# Patient Record
Sex: MALE | Race: WHITE | NOT HISPANIC OR LATINO | Employment: FULL TIME | URBAN - NONMETROPOLITAN AREA
[De-identification: names, ages, dates, MRNs, and addresses within clinical notes are randomized per-mention and may not be internally consistent; named-entity substitution may affect disease eponyms.]

---

## 2017-05-16 ENCOUNTER — OFFICE VISIT (OUTPATIENT)
Dept: CARDIOLOGY | Facility: CLINIC | Age: 82
End: 2017-05-16

## 2017-05-16 VITALS
DIASTOLIC BLOOD PRESSURE: 70 MMHG | SYSTOLIC BLOOD PRESSURE: 120 MMHG | WEIGHT: 224 LBS | HEART RATE: 68 BPM | HEIGHT: 71 IN | BODY MASS INDEX: 31.36 KG/M2

## 2017-05-16 DIAGNOSIS — E11.9 TYPE 2 DIABETES MELLITUS WITHOUT COMPLICATION, WITHOUT LONG-TERM CURRENT USE OF INSULIN (HCC): ICD-10-CM

## 2017-05-16 DIAGNOSIS — I25.10 CORONARY ARTERY DISEASE INVOLVING NATIVE CORONARY ARTERY OF NATIVE HEART WITHOUT ANGINA PECTORIS: Primary | ICD-10-CM

## 2017-05-16 DIAGNOSIS — I10 ESSENTIAL HYPERTENSION: ICD-10-CM

## 2017-05-16 DIAGNOSIS — E78.49 OTHER HYPERLIPIDEMIA: ICD-10-CM

## 2017-05-16 PROBLEM — E78.5 HYPERLIPEMIA: Status: ACTIVE | Noted: 2017-05-16

## 2017-05-16 PROBLEM — E03.9 HYPOTHYROIDISM: Status: ACTIVE | Noted: 2017-05-16

## 2017-05-16 PROCEDURE — 99213 OFFICE O/P EST LOW 20 MIN: CPT | Performed by: NURSE PRACTITIONER

## 2017-11-20 ENCOUNTER — OFFICE VISIT (OUTPATIENT)
Dept: CARDIOLOGY | Facility: CLINIC | Age: 82
End: 2017-11-20

## 2017-11-20 VITALS
BODY MASS INDEX: 32.2 KG/M2 | HEART RATE: 64 BPM | DIASTOLIC BLOOD PRESSURE: 88 MMHG | SYSTOLIC BLOOD PRESSURE: 140 MMHG | WEIGHT: 230 LBS | HEIGHT: 71 IN

## 2017-11-20 DIAGNOSIS — I25.118 CORONARY ARTERY DISEASE INVOLVING NATIVE CORONARY ARTERY OF NATIVE HEART WITH OTHER FORM OF ANGINA PECTORIS (HCC): Primary | ICD-10-CM

## 2017-11-20 DIAGNOSIS — E88.81 METABOLIC SYNDROME: ICD-10-CM

## 2017-11-20 DIAGNOSIS — E11.9 TYPE 2 DIABETES MELLITUS WITHOUT COMPLICATION, WITHOUT LONG-TERM CURRENT USE OF INSULIN (HCC): ICD-10-CM

## 2017-11-20 DIAGNOSIS — E78.49 OTHER HYPERLIPIDEMIA: ICD-10-CM

## 2017-11-20 DIAGNOSIS — I10 ESSENTIAL HYPERTENSION: ICD-10-CM

## 2017-11-20 DIAGNOSIS — R06.02 SHORTNESS OF BREATH: ICD-10-CM

## 2017-11-20 PROCEDURE — 99214 OFFICE O/P EST MOD 30 MIN: CPT | Performed by: NURSE PRACTITIONER

## 2017-11-20 NOTE — PROGRESS NOTES
Chief Complaint   Patient presents with   • Follow-up     For cardiac management. Only shortness of breath is when he bends over, he relates to weight. [facility]. He reports having more pain in left leg, he states nerve pain.       Subjective       Endy Bauer is a 82 y.o. male  with a history of ischemic heart disease diagnosed in 2008 when he underwent stenting followed by bypass surgery in 2009 and restenting in 2012. In April 2015 he was seen to establish cardiac care given his cardiologist had moved. Cardiac workup was done in the form of stress test and echocardiogram. No ischemia was noted and his ejection fraction remained normal.  Today he comes to the office for a follow up visit. He admits to shortness of breath but mostly when bending over. His blood pressure is increased today which concerns him since it has been normal most often when checked at home.     HPI         Cardiac History:    Past Surgical History:   Procedure Laterality Date   • CARDIAC CATHETERIZATION  04/24/2012     Dr DOWNS. Patent LIMA. 100% all SVG, 90% RCA- 3.5x16 & 4.0x16 Promus    • CATH LAB PROCEDURE  2008    STENT   • CORONARY ARTERY BYPASS GRAFT  2009     ([facility]utus) LIMA to LAD, SVG to OM, SVG to D1    • ECHO - CONVERTED  05/05/2009    (Dr. Barrett) EF 60%    • ECHO - CONVERTED  05/05/2015    EF 65%, RVSP 17 mmHg    • OTHER SURGICAL HISTORY  04/13/2012    L. Cardiolite- () Anterior Ischemia    • OTHER SURGICAL HISTORY  05/05/2015    Sarah- negative for ischemia        Current Outpatient Prescriptions   Medication Sig Dispense Refill   • aspirin 81 MG EC tablet Take 81 mg by mouth Daily.     • atorvastatin (LIPITOR) 10 MG tablet Take 10 mg by mouth Daily.     • clopidogrel (PLAVIX) 75 MG tablet Take 75 mg by mouth Daily.     • Coenzyme Q10 (CO Q-10) 100 MG capsule Take  by mouth Daily.     • diltiazem CD (CARDIZEM CD) 120 MG 24 hr capsule Take 120 mg by mouth Daily.     •  lisinopril (PRINIVIL,ZESTRIL) 20 MG tablet Take 20 mg by mouth Daily.     • metFORMIN (GLUCOPHAGE) 1000 MG tablet Take 1,000 mg by mouth 2 (Two) Times a Day With Meals.     • metoprolol succinate XL (TOPROL-XL) 50 MG 24 hr tablet Take 50 mg by mouth Daily.     • Misc Natural Products (OSTEO BI-FLEX JOINT SHIELD PO) Take  by mouth Daily.     • Multiple Vitamins-Minerals (MULTI COMPLETE PO) Take  by mouth Daily.     • Omega-3 Fatty Acids (FISH OIL) 1000 MG capsule capsule Take  by mouth Daily With Breakfast.     • Saw Palmetto, Serenoa repens, (SAW PALMETTO PO) Take  by mouth Daily.       No current facility-administered medications for this visit.        Demerol [meperidine]    Past Medical History:   Diagnosis Date   • Arthritis    • CAD (coronary artery disease)    • Diabetes    • History of hernia surgery    • Hypercholesterolemia    • Hypertension    • Hypothyroidism        Social History     Social History   • Marital status:      Spouse name: N/A   • Number of children: N/A   • Years of education: N/A     Occupational History   • Not on file.     Social History Main Topics   • Smoking status: Former Smoker     Packs/day: 1.00     Years: 30.00     Quit date: 1995   • Smokeless tobacco: Current User     Types: Chew   • Alcohol use No   • Drug use: No   • Sexual activity: Not on file     Other Topics Concern   • Not on file     Social History Narrative       Family History   Problem Relation Age of Onset   • Lung cancer Mother    • Heart attack Father        Review of Systems   Constitution: Negative for decreased appetite, weakness and malaise/fatigue.   HENT: Negative for congestion, hoarse voice, nosebleeds and sore throat.    Eyes: Negative for blurred vision.   Cardiovascular: Negative for chest pain, irregular heartbeat, leg swelling, near-syncope, palpitations and paroxysmal nocturnal dyspnea.   Respiratory: Positive for shortness of breath. Negative for sleep disturbances due to breathing.   "  Endocrine: Negative for cold intolerance and heat intolerance.   Hematologic/Lymphatic: Negative for adenopathy. Does not bruise/bleed easily.   Skin: Negative for itching and nail changes.   Musculoskeletal: Positive for back pain and myalgias (left upper, posterior leg area). Negative for arthritis.   Gastrointestinal: Negative for abdominal pain, dysphagia, heartburn, melena and nausea.   Genitourinary: Negative for dysuria and hematuria.   Neurological: Negative for dizziness, light-headedness, seizures and vertigo.   Psychiatric/Behavioral: Negative for altered mental status and memory loss. The patient is not nervous/anxious.    Allergic/Immunologic: Negative for hives.    Diabetes- Yes  Thyroid-abnormal    Objective     /88 (BP Location: Right arm)  Pulse 64  Ht 71\" (180.3 cm)  Wt 230 lb (104 kg)  BMI 32.08 kg/m2    Physical Exam   Constitutional: He is oriented to person, place, and time. He appears well-nourished.   HENT:   Head: Normocephalic.   Eyes: Conjunctivae are normal. Pupils are equal, round, and reactive to light.   Neck: Normal range of motion. Neck supple. Carotid bruit is not present.   Cardiovascular: Normal rate, regular rhythm, S1 normal, S2 normal and normal pulses.    No murmur heard.  Slightly loud S2   Pulmonary/Chest: Breath sounds normal. He has no rales.   Abdominal: Soft. Bowel sounds are normal. He exhibits no distension. There is no tenderness.   Musculoskeletal: Normal range of motion. He exhibits no edema.   Neurological: He is alert and oriented to person, place, and time.   Skin: Skin is warm and dry. No rash noted. No pallor.   Psychiatric: He has a normal mood and affect. His behavior is normal. Judgment and thought content normal.   Vitals reviewed.   Procedures        Assessment/Plan      Endy was seen today for follow-up.    Diagnoses and all orders for this visit:    Coronary artery disease involving native coronary artery of native heart with other form of " angina pectoris  -     Stress Test With Myocardial Perfusion One Day; Future  -     Adult Transthoracic Echo Complete W/ Cont if Necessary Per Protocol; Future    Essential hypertension  -     Stress Test With Myocardial Perfusion One Day; Future  -     Adult Transthoracic Echo Complete W/ Cont if Necessary Per Protocol; Future    Other hyperlipidemia  -     Stress Test With Myocardial Perfusion One Day; Future  -     Adult Transthoracic Echo Complete W/ Cont if Necessary Per Protocol; Future    Type 2 diabetes mellitus without complication, without long-term current use of insulin  -     Stress Test With Myocardial Perfusion One Day; Future  -     Adult Transthoracic Echo Complete W/ Cont if Necessary Per Protocol; Future    Metabolic syndrome    Shortness of breath        [facility] which he plans on having done next month and will bring a copy to the office. He reports some issues with left leg pain being managed by chiropractor and finding beneficial. Blood pressure today is elevated. He agrees to come to the office a couple of weeks to have reassess. We will also plan to repeat cardiac workup given his known CAD and risk factors, especially for silent ischemia related to diabetes. No medication changes made at this time. DASH diet encouraged. Further recommendations based on test results.            Electronically signed by LUISA Gregory,  November 20, 2017 4:38 PM

## 2017-12-11 ENCOUNTER — TELEPHONE (OUTPATIENT)
Dept: CARDIOLOGY | Facility: CLINIC | Age: 82
End: 2017-12-11

## 2017-12-11 ENCOUNTER — CLINICAL SUPPORT (OUTPATIENT)
Dept: CARDIOLOGY | Facility: CLINIC | Age: 82
End: 2017-12-11

## 2017-12-11 VITALS — SYSTOLIC BLOOD PRESSURE: 164 MMHG | DIASTOLIC BLOOD PRESSURE: 90 MMHG | HEART RATE: 63 BPM

## 2017-12-11 DIAGNOSIS — Z01.30 BLOOD PRESSURE CHECK: Primary | ICD-10-CM

## 2017-12-11 NOTE — TELEPHONE ENCOUNTER
Patient came in for BP check per your request and brought home monitor.    Manual:    /100 (left)  /90 (right)  HR 63    Home monitor:    /88 Right arm only. Home monitor would not work on left arm.    Patient is going to purchase new monitor.    Current medications:    Diltiazem  mg QD  Lisinopril 20 mg QD  Metoprolol Succ. XL 50 mg QD  Plavix 75 mg

## 2017-12-11 NOTE — TELEPHONE ENCOUNTER
[facility] He may benefit in adding diuretic but without lab results I will advise to increase Cardizem CD to 240 mg daily. He will be having a stress and echo in near future. Thanks.

## 2017-12-19 ENCOUNTER — APPOINTMENT (OUTPATIENT)
Dept: CARDIOLOGY | Facility: HOSPITAL | Age: 82
End: 2017-12-19

## 2018-01-02 ENCOUNTER — HOSPITAL ENCOUNTER (OUTPATIENT)
Dept: CARDIOLOGY | Facility: HOSPITAL | Age: 83
Discharge: HOME | End: 2018-01-02

## 2018-01-02 ENCOUNTER — OUTSIDE FACILITY SERVICE (OUTPATIENT)
Dept: CARDIOLOGY | Facility: CLINIC | Age: 83
End: 2018-01-02

## 2018-01-02 DIAGNOSIS — E11.9 TYPE 2 DIABETES MELLITUS WITHOUT COMPLICATION, WITHOUT LONG-TERM CURRENT USE OF INSULIN (HCC): ICD-10-CM

## 2018-01-02 DIAGNOSIS — I10 ESSENTIAL HYPERTENSION: ICD-10-CM

## 2018-01-02 DIAGNOSIS — E78.49 OTHER HYPERLIPIDEMIA: ICD-10-CM

## 2018-01-02 DIAGNOSIS — I25.118 CORONARY ARTERY DISEASE INVOLVING NATIVE CORONARY ARTERY OF NATIVE HEART WITH OTHER FORM OF ANGINA PECTORIS (HCC): ICD-10-CM

## 2018-01-02 LAB
MAXIMAL PREDICTED HEART RATE: 140 BPM
MAXIMAL PREDICTED HEART RATE: 140 BPM
STRESS TARGET HR: 119 BPM
STRESS TARGET HR: 119 BPM

## 2018-01-02 PROCEDURE — 0 TECHNETIUM SESTAMIBI: Performed by: INTERNAL MEDICINE

## 2018-01-02 PROCEDURE — 93018 CV STRESS TEST I&R ONLY: CPT | Performed by: INTERNAL MEDICINE

## 2018-01-02 PROCEDURE — 93306 TTE W/DOPPLER COMPLETE: CPT | Performed by: INTERNAL MEDICINE

## 2018-01-02 PROCEDURE — A9500 TC99M SESTAMIBI: HCPCS | Performed by: INTERNAL MEDICINE

## 2018-01-02 PROCEDURE — 25010000002 REGADENOSON 0.4 MG/5ML SOLUTION: Performed by: INTERNAL MEDICINE

## 2018-01-02 PROCEDURE — 78452 HT MUSCLE IMAGE SPECT MULT: CPT

## 2018-01-02 PROCEDURE — 78452 HT MUSCLE IMAGE SPECT MULT: CPT | Performed by: INTERNAL MEDICINE

## 2018-01-02 PROCEDURE — 93017 CV STRESS TEST TRACING ONLY: CPT

## 2018-01-02 PROCEDURE — 93306 TTE W/DOPPLER COMPLETE: CPT

## 2018-01-02 RX ADMIN — TECHNETIUM TC-99M SESTAMIBI 1 DOSE: 1 INJECTION INTRAVENOUS at 09:30

## 2018-01-02 RX ADMIN — REGADENOSON 0.4 MG: 0.08 INJECTION, SOLUTION INTRAVENOUS at 09:30

## 2018-01-04 ENCOUNTER — TELEPHONE (OUTPATIENT)
Dept: CARDIOLOGY | Facility: CLINIC | Age: 83
End: 2018-01-04

## 2018-01-04 NOTE — TELEPHONE ENCOUNTER
Patient aware of stress test and echo results and recommendations.  I spoke with patient's wife, Jenny.  Negative for ischemia, normal LV function, and to continue home medications.

## 2018-05-15 ENCOUNTER — OFFICE VISIT (OUTPATIENT)
Dept: CARDIOLOGY | Facility: CLINIC | Age: 83
End: 2018-05-15

## 2018-05-15 VITALS
HEART RATE: 64 BPM | HEIGHT: 71 IN | WEIGHT: 227 LBS | BODY MASS INDEX: 31.78 KG/M2 | DIASTOLIC BLOOD PRESSURE: 92 MMHG | SYSTOLIC BLOOD PRESSURE: 152 MMHG

## 2018-05-15 DIAGNOSIS — E66.09 CLASS 1 OBESITY DUE TO EXCESS CALORIES WITH SERIOUS COMORBIDITY AND BODY MASS INDEX (BMI) OF 31.0 TO 31.9 IN ADULT: ICD-10-CM

## 2018-05-15 DIAGNOSIS — E03.9 ACQUIRED HYPOTHYROIDISM: ICD-10-CM

## 2018-05-15 DIAGNOSIS — I10 ESSENTIAL HYPERTENSION: ICD-10-CM

## 2018-05-15 DIAGNOSIS — E78.00 PURE HYPERCHOLESTEROLEMIA: ICD-10-CM

## 2018-05-15 DIAGNOSIS — I25.10 CORONARY ARTERY DISEASE INVOLVING NATIVE CORONARY ARTERY OF NATIVE HEART WITHOUT ANGINA PECTORIS: Primary | ICD-10-CM

## 2018-05-15 DIAGNOSIS — R06.02 SHORTNESS OF BREATH: ICD-10-CM

## 2018-05-15 DIAGNOSIS — E11.9 TYPE 2 DIABETES MELLITUS WITHOUT COMPLICATION, WITHOUT LONG-TERM CURRENT USE OF INSULIN (HCC): ICD-10-CM

## 2018-05-15 PROCEDURE — 99214 OFFICE O/P EST MOD 30 MIN: CPT | Performed by: NURSE PRACTITIONER

## 2018-05-15 RX ORDER — AMLODIPINE BESYLATE 2.5 MG/1
2.5 TABLET ORAL DAILY
Qty: 90 TABLET | Refills: 2 | Status: SHIPPED | OUTPATIENT
Start: 2018-05-15 | End: 2019-02-06 | Stop reason: SDUPTHER

## 2018-11-12 NOTE — PROGRESS NOTES
Chief Complaint   Patient presents with   • Follow-up     For cardiac management. Patient is on aspirin. Reports he has shortness of breath when he bends. Last lab work was done a couple of months [facility], not in chart.    • Med Refill     Did not bring medication list.        Cardiac Complaints  dyspnea      Subjective   Endy Bauer is a 83 y.o. male with HTN, hyperlipidemia, DM, and ischemic heart disease diagnosed in 2008 when he underwent stenting followed by bypass surgery in 2009 and restenting in 2012. In April 2015 he was seen to establish cardiac care given his cardiologist had moved. Cardiac workup was done in the form of stress test and echocardiogram. No ischemia was noted and his ejection fraction remained normal. In 2017, he returned with issues of worsening shortness of breath and hypertension.  Repeat cardiac workup with stress and echo was advised.  Stress showed normal LV function and no ischemic burden.  Last visit, low dose of norvasc therapy added for better HTN control. He returns today for follow up and reports continued shortness of breath when he bends over but states this has been the same for many years and has not worsened.  He denies chest pain, shortness of breath, and palpitations.  [facility], no copy available for review.  No med list is available, no refills needed.        Cardiac History  Past Surgical History:   Procedure Laterality Date   • CARDIAC CATHETERIZATION  04/24/2012     Dr GAITAN). Patent LIMA. 100% all SVG, 90% RCA- 3.5x16 & 4.0x16 Promus    • CARDIAC CATHETERIZATION  2008    STENT   • CARDIOVASCULAR STRESS TEST  05/05/2015    L. Cardiolite- Negative   • CARDIOVASCULAR STRESS TEST  04/13/2012    L. Cardiolite- () Anterior Ischemia    • CARDIOVASCULAR STRESS TEST  01/02/2018    L. Cardiolite- Negative   • CORONARY ARTERY BYPASS GRAFT  2009     ([facility]) LIMA to LAD, SVG to OM, SVG to D1    • ECHO - CONVERTED  05/05/2009    (Dr. Barrett)  EF 60%    • ECHO - CONVERTED  05/05/2015    EF 65%, RVSP 17 mmHg    • ECHO - CONVERTED  01/02/2018    EF 65%   • ECHO - CONVERTED  01/02/2018    EF 65%, mild MR, noAS       Current Outpatient Medications   Medication Sig Dispense Refill   • aspirin 81 MG EC tablet Take 81 mg by mouth Daily.     • amLODIPine (NORVASC) 2.5 MG tablet Take 1 tablet by mouth Daily. 90 tablet 2   • atorvastatin (LIPITOR) 10 MG tablet Take 10 mg by mouth Daily.     • clopidogrel (PLAVIX) 75 MG tablet Take 75 mg by mouth Daily.     • Coenzyme Q10 (CO Q-10) 100 MG capsule Take  by mouth Daily.     • diltiazem CD (CARDIZEM CD) 120 MG 24 hr capsule Take 120 mg by mouth Daily.     • lisinopril (PRINIVIL,ZESTRIL) 20 MG tablet Take 20 mg by mouth Daily.     • metFORMIN (GLUCOPHAGE) 1000 MG tablet Take 1,000 mg by mouth 2 (Two) Times a Day With Meals.     • metoprolol succinate XL (TOPROL-XL) 50 MG 24 hr tablet Take 50 mg by mouth Daily.     • Misc Natural Products (OSTEO BI-FLEX JOINT SHIELD PO) Take  by mouth Daily.     • Multiple Vitamins-Minerals (MULTI COMPLETE PO) Take  by mouth Daily.     • Omega-3 Fatty Acids (FISH OIL) 1000 MG capsule capsule Take  by mouth Daily With Breakfast.     • Saw Palmetto, Serenoa repens, (SAW PALMETTO PO) Take  by mouth Daily.       No current facility-administered medications for this visit.        Demerol [meperidine]    Past Medical History:   Diagnosis Date   • Arthritis    • CAD (coronary artery disease)    • Diabetes (CMS/HCC)    • History of hernia surgery    • Hypercholesterolemia    • Hypertension    • Hypothyroidism        Social History     Socioeconomic History   • Marital status:      Spouse name: Not on file   • Number of children: Not on file   • Years of education: Not on file   • Highest education level: Not on file   Social Needs   • Financial resource strain: Not on file   • Food insecurity - worry: Not on file   • Food insecurity - inability: Not on file   • Transportation needs -  "medical: Not on file   • Transportation needs - non-medical: Not on file   Occupational History   • Not on file   Tobacco Use   • Smoking status: Former Smoker     Packs/day: 1.00     Years: 30.00     Pack years: 30.00     Last attempt to quit:      Years since quittin.8   • Smokeless tobacco: Current User     Types: Chew   Substance and Sexual Activity   • Alcohol use: No   • Drug use: No   • Sexual activity: Not on file   Other Topics Concern   • Not on file   Social History Narrative   • Not on file       Family History   Problem Relation Age of Onset   • Lung cancer Mother    • Heart attack Father        Review of Systems   Constitution: Negative for weakness and malaise/fatigue.   Cardiovascular: Positive for dyspnea on exertion. Negative for chest pain, irregular heartbeat, orthopnea, palpitations and syncope.   Respiratory: Positive for shortness of breath. Negative for cough and wheezing.    Musculoskeletal: Negative for back pain, joint pain and myalgias.   Gastrointestinal: Negative for anorexia, heartburn, nausea and vomiting.   Genitourinary: Negative for dysuria, hematuria, hesitancy and nocturia.   Neurological: Negative for dizziness, light-headedness and loss of balance.   Psychiatric/Behavioral: Negative for depression and memory loss. The patient is not nervous/anxious.            Objective     /92 (BP Location: Left arm)   Pulse 64   Ht 180.3 cm (70.98\")   Wt 97.1 kg (214 lb)   BMI 29.86 kg/m²     Physical Exam   Constitutional: He is oriented to person, place, and time. He appears well-developed and well-nourished.   HENT:   Head: Normocephalic and atraumatic.   Eyes: EOM are normal. Pupils are equal, round, and reactive to light.   Neck: Normal range of motion. Neck supple.   Cardiovascular: Normal rate and regular rhythm.   Murmur heard.  Pulmonary/Chest: Effort normal and breath sounds normal.   Abdominal: Soft.   Musculoskeletal: Normal range of motion.   Neurological: He " is alert and oriented to person, place, and time.   Skin: Skin is warm and dry.   Psychiatric: He has a normal mood and affect. His behavior is normal.       Procedures    Assessment/Plan     HR is stable today.  Blood pressure is slightly elevated at 142/92, patient reports at home it is well controlled, no adjustment to current advised for HTN management. For history of CAD, he will continue on DAPT therapy with plavix and ASA.  For hyperlipidemia, he has continued on statin therapy and reports tolerating well.  Labs including his FLP for maintenance and [facility]  No recent are available for review, but he thinks both looked okay and no adjustment to statin or metformin was advised.  No new workup will be recommended today as no new or worsening cardiac symptoms are reported.  BMI is elevated at 29.86 but weight has improved by about 13 pounds since following a gluten free diet.  Patient praised for his efforts and encouraged to continue.  No refills are needed at present.  6 month follow up advised or sooner if needed.        Problems Addressed this Visit        Cardiovascular and Mediastinum    Coronary artery disease involving native coronary artery of native heart without angina pectoris - Primary    HTN (hypertension)    Hyperlipemia       Endocrine    Hypothyroidism    Type 2 diabetes mellitus without complication, without long-term current use of insulin (CMS/[facility])      Other Visit Diagnoses     Overweight              Patient's Body mass index is 29.86 kg/m². BMI is above normal parameters. Recommendations include: nutrition counseling.            Electronically signed by LUISA Marion November 13, 2018 2:39 PM

## 2018-11-13 ENCOUNTER — OFFICE VISIT (OUTPATIENT)
Dept: CARDIOLOGY | Facility: CLINIC | Age: 83
End: 2018-11-13

## 2018-11-13 VITALS
SYSTOLIC BLOOD PRESSURE: 142 MMHG | WEIGHT: 214 LBS | BODY MASS INDEX: 29.96 KG/M2 | HEIGHT: 71 IN | DIASTOLIC BLOOD PRESSURE: 92 MMHG | HEART RATE: 64 BPM

## 2018-11-13 DIAGNOSIS — E78.00 PURE HYPERCHOLESTEROLEMIA: ICD-10-CM

## 2018-11-13 DIAGNOSIS — E11.9 TYPE 2 DIABETES MELLITUS WITHOUT COMPLICATION, WITHOUT LONG-TERM CURRENT USE OF INSULIN (HCC): ICD-10-CM

## 2018-11-13 DIAGNOSIS — E03.9 ACQUIRED HYPOTHYROIDISM: ICD-10-CM

## 2018-11-13 DIAGNOSIS — I25.10 CORONARY ARTERY DISEASE INVOLVING NATIVE CORONARY ARTERY OF NATIVE HEART WITHOUT ANGINA PECTORIS: Primary | ICD-10-CM

## 2018-11-13 DIAGNOSIS — E66.3 OVERWEIGHT: ICD-10-CM

## 2018-11-13 DIAGNOSIS — I10 ESSENTIAL HYPERTENSION: ICD-10-CM

## 2018-11-13 PROCEDURE — 99213 OFFICE O/P EST LOW 20 MIN: CPT | Performed by: NURSE PRACTITIONER

## 2019-01-04 ENCOUNTER — TELEPHONE (OUTPATIENT)
Dept: CARDIOLOGY | Facility: CLINIC | Age: 84
End: 2019-01-04

## 2019-01-04 NOTE — TELEPHONE ENCOUNTER
Received fax from [facility] for cardiac clearance for patient to have an L2-3 extraforaminal discectomy. Patient is on aspirin and Plavix. According to our records, patient's last stent was done on 04/24/12.      Fax [phone]

## 2019-02-05 ENCOUNTER — TELEPHONE (OUTPATIENT)
Dept: CARDIOLOGY | Facility: CLINIC | Age: 84
End: 2019-02-05

## 2019-02-05 NOTE — TELEPHONE ENCOUNTER
Patient asked for refills on amlodipine 2.5 mg QD to be sent to [facility] with 90 day supplies. Patient did not bring medication list at last appointment, is it ok to refill?

## 2019-02-06 RX ORDER — AMLODIPINE BESYLATE 2.5 MG/1
2.5 TABLET ORAL DAILY
Qty: 90 TABLET | Refills: 1 | Status: SHIPPED | OUTPATIENT
Start: 2019-02-06

## 2019-04-23 RX ORDER — AMLODIPINE BESYLATE 2.5 MG/1
TABLET ORAL
Qty: 90 TABLET | Refills: 1 | OUTPATIENT
Start: 2019-04-23

## 2019-04-24 RX ORDER — AMLODIPINE BESYLATE 2.5 MG/1
TABLET ORAL
Qty: 90 TABLET | Refills: 2 | OUTPATIENT
Start: 2019-04-24

## 2019-05-13 ENCOUNTER — OFFICE VISIT (OUTPATIENT)
Dept: CARDIOLOGY | Facility: CLINIC | Age: 84
End: 2019-05-13

## 2019-05-13 VITALS
WEIGHT: 225.4 LBS | DIASTOLIC BLOOD PRESSURE: 86 MMHG | HEART RATE: 68 BPM | SYSTOLIC BLOOD PRESSURE: 138 MMHG | HEIGHT: 71 IN | BODY MASS INDEX: 31.56 KG/M2

## 2019-05-13 DIAGNOSIS — I25.9 IHD (ISCHEMIC HEART DISEASE): Primary | ICD-10-CM

## 2019-05-13 DIAGNOSIS — E11.9 TYPE 2 DIABETES MELLITUS WITHOUT COMPLICATION, WITHOUT LONG-TERM CURRENT USE OF INSULIN (HCC): ICD-10-CM

## 2019-05-13 DIAGNOSIS — I10 ESSENTIAL HYPERTENSION: ICD-10-CM

## 2019-05-13 DIAGNOSIS — E78.00 PURE HYPERCHOLESTEROLEMIA: ICD-10-CM

## 2019-05-13 DIAGNOSIS — I25.10 CORONARY ARTERY DISEASE INVOLVING NATIVE CORONARY ARTERY OF NATIVE HEART WITHOUT ANGINA PECTORIS: ICD-10-CM

## 2019-05-13 DIAGNOSIS — E03.9 ACQUIRED HYPOTHYROIDISM: ICD-10-CM

## 2019-05-13 PROCEDURE — 99213 OFFICE O/P EST LOW 20 MIN: CPT | Performed by: NURSE PRACTITIONER

## 2019-05-13 RX ORDER — CHOLECALCIFEROL (VITAMIN D3) 125 MCG
5 CAPSULE ORAL NIGHTLY
COMMUNITY

## 2019-05-13 RX ORDER — LEVOTHYROXINE SODIUM 0.12 MG/1
125 TABLET ORAL DAILY
COMMUNITY

## 2019-05-13 RX ORDER — CYCLOBENZAPRINE HCL 10 MG
10 TABLET ORAL 3 TIMES DAILY PRN
COMMUNITY

## 2019-05-13 NOTE — PROGRESS NOTES
Chief Complaint   Patient presents with   • Follow-up     cardiac management . Been hospitiazed three times for Sciatica . Dr. Tijerina discontinued Plavix and  Aspirin inDec 2018 d/t blood being too thin   • Aspirin     81 mg daily dose       Subjective       Endy Bauer is a 83 y.o. male  with HTN, hyperlipidemia, DM, and ischemic heart disease diagnosed in 2008 when he underwent stenting followed by bypass surgery in 2009 and restenting in 2012. In April 2015 he was seen to establish cardiac care given his cardiologist had moved. Cardiac workup was done in the form of stress test and echocardiogram. No ischemia was noted and his ejection fraction remained normal. In 2017, he returned with issues of worsening shortness of breath and hypertension.  Repeat stress showed normal LV function and no ischemic burden.  Low dose of Norvasc therapy added for better HTN control.  Recently, he has been hospitalized for sciatica followed by Dr. Harman Kamara.  Plavix and aspirin were discontinued due to low platelet count.  He was referred to Dr. Murry and platelet transfusion was recommended but he developed reaction and transfusion stopped.    Today he comes to the office for a follow-up visit and no cardiac complaints are voiced.  He denies chest pain, palpitations, dizziness, or inappropriate shortness of breath.    HPI     Cardiac History:    Past Surgical History:   Procedure Laterality Date   • CARDIAC CATHETERIZATION  04/24/2012     Dr GAITAN). Patent LIMA. 100% all SVG, 90% RCA- 3.5x16 & 4.0x16 Promus    • CARDIAC CATHETERIZATION  2008    STENT   • CARDIOVASCULAR STRESS TEST  05/05/2015    L. Cardiolite- Negative   • CARDIOVASCULAR STRESS TEST  04/13/2012    L. Cardiolite- () Anterior Ischemia    • CARDIOVASCULAR STRESS TEST  01/02/2018    L. Cardiolite- Negative   • CORONARY ARTERY BYPASS GRAFT  2009     ([place]) LIMA to LAD, SVG to OM, SVG to D1    • ECHO - CONVERTED  05/05/2009    (Dr. Barrett) EF 60%    •  ECHO - CONVERTED  05/05/2015    EF 65%, RVSP 17 mmHg    • ECHO - CONVERTED  01/02/2018    EF 65%   • ECHO - CONVERTED  01/02/2018    EF 65%, mild MR, noAS       Current Outpatient Medications   Medication Sig Dispense Refill   • amLODIPine (NORVASC) 2.5 MG tablet Take 1 tablet by mouth Daily. 90 tablet 1   • atorvastatin (LIPITOR) 10 MG tablet Take 10 mg by mouth Daily.     • Coenzyme Q10 (CO Q-10) 100 MG capsule Take  by mouth Daily.     • cyclobenzaprine (FLEXERIL) 10 MG tablet Take 10 mg by mouth 3 (Three) Times a Day As Needed for Muscle Spasms.     • diltiazem CD (CARDIZEM CD) 120 MG 24 hr capsule Take 120 mg by mouth Daily.     • levothyroxine (SYNTHROID, LEVOTHROID) 125 MCG tablet Take 125 mcg by mouth Daily.     • melatonin 5 MG tablet tablet Take 5 mg by mouth Every Night.     • metFORMIN (GLUCOPHAGE) 1000 MG tablet Take 1,000 mg by mouth 2 (Two) Times a Day With Meals.     • metoprolol succinate XL (TOPROL-XL) 50 MG 24 hr tablet Take 50 mg by mouth Daily.     • Misc Natural Products (OSTEO BI-FLEX JOINT SHIELD PO) Take  by mouth Daily.     • Multiple Vitamins-Minerals (MULTI COMPLETE PO) Take  by mouth Daily.     • Omega-3 Fatty Acids (FISH OIL) 1000 MG capsule capsule Take  by mouth Daily With Breakfast.     • Saw Palmetto, Serenoa repens, (SAW PALMETTO PO) Take  by mouth Daily.     • lisinopril (PRINIVIL,ZESTRIL) 20 MG tablet Take 20 mg by mouth Daily.       No current facility-administered medications for this visit.        Demerol [meperidine]    Past Medical History:   Diagnosis Date   • Arthritis    • CAD (coronary artery disease)    • Diabetes (CMS/HCC)    • History of hernia surgery    • Hypercholesterolemia    • Hypertension    • Hypothyroidism        Social History     Socioeconomic History   • Marital status:      Spouse name: Not on file   • Number of children: Not on file   • Years of education: Not on file   • Highest education level: Not on file   Tobacco Use   • Smoking status:  "Former Smoker     Packs/day: 1.00     Years: 30.00     Pack years: 30.00     Last attempt to quit:      Years since quittin.3   • Smokeless tobacco: Current User     Types: Chew   Substance and Sexual Activity   • Alcohol use: No   • Drug use: No       Family History   Problem Relation Age of Onset   • Lung cancer Mother    • Heart attack Father        Review of Systems   Constitution: Negative for decreased appetite and malaise/fatigue.   HENT: Positive for hearing loss (not a new problem). Negative for congestion, hoarse voice and nosebleeds.    Eyes: Negative for redness and visual disturbance.   Cardiovascular: Negative for chest pain, irregular heartbeat, leg swelling, near-syncope and palpitations.   Respiratory: Negative for cough and shortness of breath.    Endocrine: Negative for polydipsia and polyuria.   Hematologic/Lymphatic: Negative for bleeding problem. Does not bruise/bleed easily.   Skin: Negative for dry skin and itching.   Musculoskeletal: Positive for back pain, joint pain and muscle weakness (left leg). Negative for falls.   Gastrointestinal: Negative for heartburn and melena.   Genitourinary: Positive for frequency and urgency. Negative for dysuria and hematuria.   Neurological: Positive for loss of balance (relates to back problem). Negative for dizziness, headaches and light-headedness.   Psychiatric/Behavioral: Negative for memory loss. The patient does not have insomnia and is not nervous/anxious.    Allergic/Immunologic: Negative for hives and persistent infections.        Objective     /86 (BP Location: Right arm)   Pulse 68   Ht 180.3 cm (70.98\")   Wt 102 kg (225 lb 6.4 oz)   BMI 31.45 kg/m²     Physical Exam   Constitutional: He is oriented to person, place, and time. Vital signs are normal. He appears well-nourished.   HENT:   Head: Normocephalic.   Eyes: Conjunctivae are normal. Pupils are equal, round, and reactive to light.   Neck: Normal range of motion. Neck " supple. Carotid bruit is not present.   Cardiovascular: Normal rate, regular rhythm, S1 normal and S2 normal.   No murmur heard.  Pulses:       Radial pulses are 2+ on the right side, and 2+ on the left side.   Pulmonary/Chest: Breath sounds normal. He has no wheezes. He has no rales.   Abdominal: Soft. Bowel sounds are normal. He exhibits no distension. There is no tenderness.   Musculoskeletal: Normal range of motion. He exhibits no edema.   Neurological: He is alert and oriented to person, place, and time.   Skin: Skin is warm and dry. No bruising and no ecchymosis noted. No cyanosis. No pallor. Nails show no clubbing.   Psychiatric: He has a normal mood and affect.      Procedures: none today        Assessment/Plan      Endy was seen today for follow-up and aspirin.    Diagnoses and all orders for this visit:    IHD (ischemic heart disease)    Coronary artery disease involving native coronary artery of native heart without angina pectoris    Essential hypertension    Pure hypercholesterolemia    Type 2 diabetes mellitus without complication, without long-term current use of insulin (CMS/[facility])    Acquired hypothyroidism      Mr. Bauer has history of ischemic heart disease but currently he is off aspirin therapy due to low platelets.  [facility]  He agrees to bring a copy of lab results to the office when available.    His blood pressure, heart rate, and heart rhythm are normal today.  The same dose of metoprolol, lisinopril, Cardizem, and Norvasc advised.  [facility] obtained through the [facility].    For hypercholesterolemia he is on statin therapy in the form of Lipitor.  He denies side effects or issues.  Lipid panel and liver function test ordered per [facility].    Patient's Body mass index is 31.45 kg/m². BMI is above normal parameters. Recommendations include: nutrition counseling.  I encouraged him on diabetic/heart healthy diet.    He is currently having issues with lower back  and left leg pain.  He is using a cane for ambulation for safety purposes.  He denies any recent falls.  I encouraged him on exercise and activity as directed by neurosurgeon.     He denies any current issues with diabetes or thyroid management.  [facility] for [facility].    From a cardiac standpoint Mr. Bauer appears stable.  No repeat cardiac testing advised at this time.  We will see him back in 6 months.  Please call sooner for any cardiac concerns.            Electronically signed by LUISA Gregory,  May 13, 2019 12:52 PM

## 2019-11-11 ENCOUNTER — OFFICE VISIT (OUTPATIENT)
Dept: CARDIOLOGY | Facility: CLINIC | Age: 84
End: 2019-11-11

## 2019-11-11 VITALS
DIASTOLIC BLOOD PRESSURE: 80 MMHG | BODY MASS INDEX: 30.38 KG/M2 | SYSTOLIC BLOOD PRESSURE: 130 MMHG | HEIGHT: 71 IN | HEART RATE: 64 BPM | WEIGHT: 217 LBS

## 2019-11-11 DIAGNOSIS — E11.9 TYPE 2 DIABETES MELLITUS WITHOUT COMPLICATION, WITHOUT LONG-TERM CURRENT USE OF INSULIN (HCC): ICD-10-CM

## 2019-11-11 DIAGNOSIS — E78.00 PURE HYPERCHOLESTEROLEMIA: ICD-10-CM

## 2019-11-11 DIAGNOSIS — I10 ESSENTIAL HYPERTENSION: ICD-10-CM

## 2019-11-11 DIAGNOSIS — I25.10 CORONARY ARTERY DISEASE INVOLVING NATIVE CORONARY ARTERY OF NATIVE HEART WITHOUT ANGINA PECTORIS: Primary | ICD-10-CM

## 2019-11-11 PROCEDURE — 99213 OFFICE O/P EST LOW 20 MIN: CPT | Performed by: NURSE PRACTITIONER

## 2019-11-11 NOTE — PROGRESS NOTES
Chief Complaint   Patient presents with   • Follow-up     Cardiac management. Patient did not bring medication list, will call office back with medication list.   • Shortness of Breath     Only has when bending over.   • Lab     [facility].       Subjective       Endy Bauer is an 84 y.o. male with HTN, hyperlipidemia, DM, and ischemic heart disease diagnosed in 2008 when he underwent stenting followed by bypass surgery in 2009 and restenting in 2012. In April 2015, he established care in our office after his cardiologist moved. Cardiac workup was done in the form of stress test and echocardiogram. No ischemia was noted and his ejection fraction remained normal. In 2017, he returned with worsening shortness of breath and hypertension.  Repeat stress showed normal LV function and no ischemia. Amlodipine 2.5 mg added for better control of the blood pressure. He was later hospitalized for sciatica followed by Dr. Harman Kamara with plan for surgery.  Plavix and aspirin were discontinued due to low platelet count.  He was referred to Dr. Murry and platelet transfusion was recommended but he developed reaction and transfusion stopped. He came today for regular follow up. He denies chest pain or shortness of breath. He continues to work part-time. [facility]. Most recent A1C around 7%. Lipids have been well controlled with atorvastatin.     HPI         Cardiac History:    Past Surgical History:   Procedure Laterality Date   • CARDIAC CATHETERIZATION  04/24/2012     Dr GAITAN). Patent LIMA. 100% all SVG, 90% RCA- 3.5x16 & 4.0x16 Promus    • CARDIAC CATHETERIZATION  2008    STENT   • CARDIOVASCULAR STRESS TEST  05/05/2015    L. Cardiolite- Negative   • CARDIOVASCULAR STRESS TEST  04/13/2012    L. Cardiolite- () Anterior Ischemia    • CARDIOVASCULAR STRESS TEST  01/02/2018    L. Cardiolite- Negative   • CORONARY ARTERY BYPASS GRAFT  2009     ([place]) LIMA to LAD, SVG to OM, SVG to D1    •  ECHO - CONVERTED  05/05/2009    (Dr. Barrett) EF 60%    • ECHO - CONVERTED  05/05/2015    EF 65%, RVSP 17 mmHg    • ECHO - CONVERTED  01/02/2018    EF 65%, mild MR, noAS       Current Outpatient Medications   Medication Sig Dispense Refill   • amLODIPine (NORVASC) 2.5 MG tablet Take 1 tablet by mouth Daily. 90 tablet 1   • atorvastatin (LIPITOR) 10 MG tablet Take 10 mg by mouth Daily.     • Coenzyme Q10 (CO Q-10) 100 MG capsule Take  by mouth Daily.     • cyclobenzaprine (FLEXERIL) 10 MG tablet Take 10 mg by mouth 3 (Three) Times a Day As Needed for Muscle Spasms.     • diltiazem CD (CARDIZEM CD) 120 MG 24 hr capsule Take 120 mg by mouth Daily.     • levothyroxine (SYNTHROID, LEVOTHROID) 125 MCG tablet Take 125 mcg by mouth Daily.     • lisinopril (PRINIVIL,ZESTRIL) 20 MG tablet Take 20 mg by mouth Daily.     • melatonin 5 MG tablet tablet Take 5 mg by mouth Every Night.     • metFORMIN (GLUCOPHAGE) 1000 MG tablet Take 1,000 mg by mouth 2 (Two) Times a Day With Meals.     • metoprolol succinate XL (TOPROL-XL) 50 MG 24 hr tablet Take 50 mg by mouth Daily.     • Misc Natural Products (OSTEO BI-FLEX JOINT SHIELD PO) Take  by mouth Daily.     • Multiple Vitamins-Minerals (MULTI COMPLETE PO) Take  by mouth Daily.     • Omega-3 Fatty Acids (FISH OIL) 1000 MG capsule capsule Take  by mouth Daily With Breakfast.     • Saw Palmetto, Serenoa repens, (SAW PALMETTO PO) Take  by mouth Daily.       No current facility-administered medications for this visit.      Demerol [meperidine]    Past Medical History:   Diagnosis Date   • Arthritis    • CAD (coronary artery disease)    • Diabetes (CMS/HCC)    • History of hernia surgery    • Hypercholesterolemia    • Hypertension    • Hypothyroidism      Social History     Socioeconomic History   • Marital status:      Spouse name: Not on file   • Number of children: Not on file   • Years of education: Not on file   • Highest education level: Not on file   Tobacco Use   • Smoking  "status: Former Smoker     Packs/day: 1.00     Years: 30.00     Pack years: 30.00     Last attempt to quit:      Years since quittin.8   • Smokeless tobacco: Current User     Types: Chew   Substance and Sexual Activity   • Alcohol use: No   • Drug use: No     Family History   Problem Relation Age of Onset   • Lung cancer Mother    • Heart attack Father      Review of Systems   Constitution: Positive for weight loss (down 8 lb).   HENT: Negative.    Eyes: Negative.    Cardiovascular: Positive for leg swelling (mild ). Negative for chest pain, dyspnea on exertion (only with bending over ), palpitations and syncope.   Respiratory: Negative for cough and shortness of breath.    Endocrine: Negative.    Hematologic/Lymphatic: Negative.    Skin: Negative.    Musculoskeletal: Negative for falls and myalgias.   Gastrointestinal: Negative for abdominal pain and melena.   Genitourinary: Negative for dysuria and hematuria.   Neurological: Negative for dizziness.   Psychiatric/Behavioral: Negative for altered mental status and depression.   Allergic/Immunologic: Negative.       Diabetes- Yes  Thyroid-normal    Objective     /80 (BP Location: Left arm)   Pulse 64   Ht 180.3 cm (70.98\")   Wt 98.4 kg (217 lb)   BMI 30.28 kg/m²     Physical Exam   Constitutional: He is oriented to person, place, and time. He appears well-developed and well-nourished. No distress.   HENT:   Head: Normocephalic and atraumatic.   Eyes: Pupils are equal, round, and reactive to light.   Neck: Normal range of motion.   Cardiovascular: Normal rate, regular rhythm and intact distal pulses.   Pulmonary/Chest: Effort normal and breath sounds normal. No respiratory distress.   Abdominal: Soft. Bowel sounds are normal.   Musculoskeletal: Normal range of motion. He exhibits edema.   Neurological: He is alert and oriented to person, place, and time.   Skin: Skin is warm and dry. He is not diaphoretic.   Psychiatric: He has a normal mood and " affect.   Nursing note and vitals reviewed.     Procedures          Assessment/Plan      Endy was seen today for follow-up, shortness of breath and lab.    Diagnoses and all orders for this visit:    Coronary artery disease involving native coronary artery of native heart without angina pectoris    Essential hypertension    Pure hypercholesterolemia    Type 2 diabetes mellitus without complication, without long-term current use of insulin (CMS/[facility])    Heart rate and blood pressure are stable. Continue lisinopril, metoprolol, amlodipine, and he is also on low dose diltiazem. We reviewed past cardiac interventions and most recent stress in 2018 showing no ischemia and normal LV function. He has no new symptoms. I did not change any of his medications today. He remains off antiplatelet therapy secondary to thrombocytopenia. [facility]. He did not return to Dr. Murry for follow up after complications with platelet infusion. No labs available for review. Continue low dose atorvastatin for lipid management. He has lost 8 lbs since last visit, encouraged to continue with his weight loss effort. He continues to have sciatica managed by neuro and PCP. Avoid heavy lifting. Stretching with regular walking encouraged. Tobacco cessation encouraged. No new work up ordered today. Follow up in six months.     Patient's Body mass index is 30.28 kg/m². BMI is above normal parameters. Recommendations include: nutrition counseling.               Electronically signed by LUISA Owens,  November 11, 2019 7:23 PM

## 2019-11-20 ENCOUNTER — TELEPHONE (OUTPATIENT)
Dept: CARDIOLOGY | Facility: CLINIC | Age: 84
End: 2019-11-20

## 2019-11-20 RX ORDER — LEVOTHYROXINE SODIUM 137 UG/1
137 TABLET ORAL DAILY
COMMUNITY

## 2020-01-13 ENCOUNTER — TELEPHONE (OUTPATIENT)
Dept: CARDIOLOGY | Facility: CLINIC | Age: 85
End: 2020-01-13

## 2020-01-13 NOTE — TELEPHONE ENCOUNTER
Received fax from Dr. Harman Kamara for cardiac clearance for patient to have a left L2-3 HLPF. Patient is on aspirin and they are requesting to hold. According to our records, patient's last stenting was done on 04/24/12 and patient had a CABG in 2009.      Fax [phone]

## 2022-06-18 NOTE — PROGRESS NOTES
Chief Complaint   Patient presents with   • Follow-up     For cardiac management. Reports he has some shortness of breath when he bends over. Last lab work was done around February 2018 [facility], not in chart.    • Med Refill     [facility]        Cardiac Complaints  none      Subjective   Endy Bauer is a 82 y.o. male with HTN, hyperlipidemia, DM, and ischemic heart disease diagnosed in 2008 when he underwent stenting followed by bypass surgery in 2009 and restenting in 2012. In April 2015 he was seen to establish cardiac care given his cardiologist had moved. Cardiac workup was done in the form of stress test and echocardiogram. No ischemia was noted and his ejection fraction remained normal.  He returned last visit with issues of worsening shortness of breath and hypertension.  Repeat cardiac workup with stress and echo was advised.  Stress showed normal LV function and no ischemic burden.  He returns today for follow up and reports doing well.  He does continue to have some issues with shortness of breath if he bends over but states this is unchanged from prior.  He denies chest pain and palpitations.  [facility].  He thinks most recent looked okay from February, no refills needed as they are done with [facility] as well.        Cardiac History  Past Surgical History:   Procedure Laterality Date   • CARDIAC CATHETERIZATION  04/24/2012     Dr GAITAN). Patent LIMA. 100% all SVG, 90% RCA- 3.5x16 & 4.0x16 Promus    • CARDIAC CATHETERIZATION  2008    STENT   • CARDIOVASCULAR STRESS TEST  05/05/2015    L. Cardiolite- Negative   • CARDIOVASCULAR STRESS TEST  04/13/2012    L. Cardiolite- () Anterior Ischemia    • CARDIOVASCULAR STRESS TEST  01/02/2018    L. Cardiolite- Negative   • CORONARY ARTERY BYPASS GRAFT  2009     ([place]) LIMA to LAD, SVG to OM, SVG to D1    • ECHO - CONVERTED  05/05/2009    (Dr. Barrett) EF 60%    • ECHO - CONVERTED  05/05/2015    EF 65%, RVSP 17 mmHg    • ECHO -  CONVERTED  01/02/2018    EF 65%   • ECHO - CONVERTED  01/02/2018    EF 65%, mild MR, noAS       Current Outpatient Prescriptions   Medication Sig Dispense Refill   • amLODIPine (NORVASC) 2.5 MG tablet Take 1 tablet by mouth Daily. 90 tablet 2   • aspirin 81 MG EC tablet Take 81 mg by mouth Daily.     • atorvastatin (LIPITOR) 10 MG tablet Take 10 mg by mouth Daily.     • clopidogrel (PLAVIX) 75 MG tablet Take 75 mg by mouth Daily.     • Coenzyme Q10 (CO Q-10) 100 MG capsule Take  by mouth Daily.     • diltiazem CD (CARDIZEM CD) 120 MG 24 hr capsule Take 120 mg by mouth Daily.     • lisinopril (PRINIVIL,ZESTRIL) 20 MG tablet Take 20 mg by mouth Daily.     • metFORMIN (GLUCOPHAGE) 1000 MG tablet Take 1,000 mg by mouth 2 (Two) Times a Day With Meals.     • metoprolol succinate XL (TOPROL-XL) 50 MG 24 hr tablet Take 50 mg by mouth Daily.     • Misc Natural Products (OSTEO BI-FLEX JOINT SHIELD PO) Take  by mouth Daily.     • Multiple Vitamins-Minerals (MULTI COMPLETE PO) Take  by mouth Daily.     • Omega-3 Fatty Acids (FISH OIL) 1000 MG capsule capsule Take  by mouth Daily With Breakfast.     • Saw Palmetto, Serenoa repens, (SAW PALMETTO PO) Take  by mouth Daily.       No current facility-administered medications for this visit.        Demerol [meperidine]    Past Medical History:   Diagnosis Date   • Arthritis    • CAD (coronary artery disease)    • Diabetes    • History of hernia surgery    • Hypercholesterolemia    • Hypertension    • Hypothyroidism        Social History     Social History   • Marital status:      Spouse name: N/A   • Number of children: N/A   • Years of education: N/A     Occupational History   • Not on file.     Social History Main Topics   • Smoking status: Former Smoker     Packs/day: 1.00     Years: 30.00     Quit date: 1995   • Smokeless tobacco: Current User     Types: Chew   • Alcohol use No   • Drug use: No   • Sexual activity: Not on file     Other Topics Concern   • Not on file  "    Social History Narrative   • No narrative on file       Family History   Problem Relation Age of Onset   • Lung cancer Mother    • Heart attack Father        Review of Systems   Constitution: Negative for weakness and malaise/fatigue.   Cardiovascular: Negative for chest pain, dyspnea on exertion, irregular heartbeat, near-syncope and palpitations.   Respiratory: Positive for shortness of breath. Negative for cough and wheezing.    Musculoskeletal: Negative for back pain, joint swelling and muscle weakness.   Gastrointestinal: Negative for anorexia, heartburn and nausea.   Genitourinary: Negative for dysuria, hematuria, hesitancy and nocturia.   Neurological: Negative for dizziness, light-headedness and loss of balance.   Psychiatric/Behavioral: Negative for depression and memory loss. The patient is not nervous/anxious.            Objective     /92 (BP Location: Left arm)   Pulse 64   Ht 180.3 cm (70.98\")   Wt 103 kg (227 lb)   BMI 31.67 kg/m²     Physical Exam   Constitutional: He is oriented to person, place, and time. He appears well-developed and well-nourished.   HENT:   Head: Normocephalic and atraumatic.   Eyes: EOM are normal. Pupils are equal, round, and reactive to light.   Neck: Normal range of motion. Neck supple.   Cardiovascular: Normal rate and regular rhythm.    Murmur heard.  Pulmonary/Chest: Effort normal and breath sounds normal.   Abdominal: Soft. Bowel sounds are normal.   Musculoskeletal: Normal range of motion.   Neurological: He is alert and oriented to person, place, and time.   Skin: Skin is warm and dry.   Psychiatric: He has a normal mood and affect. His behavior is normal.       Procedures    Assessment/Plan     HR is stable today at 64.  BP is elevated at 152/92.  Patient encouraged to limit his sodium intake as well as fast food.  He admits he did increase his cardizem to 240mg even though his med list says 120mg.  We will encourage to add low dose of norvasc daily at " 2.5mg as no recent labs are available to assess renal function.  Patient encouraged to keep blood pressure log with the change at home.  For CAD he continues on aspirin and plavix therapy and has done well.  Most recent cardiac workup from the fall reviewed with patient with no ischemic burden and normal LV function seen.  No new cardiac workup advised as no new/worsening cardiac symptoms reported.  For DM, he has continued on glucophage therapy but unsure of most recent AIC as [facility], as well as his refills.  He thinks it looked okay.  BMI elevated at 31.67, patient advised on limiting his sugar intake as he reports eating more snacks than he should and increasing his physical activity.  6 month follow up recommended or sooner if needed.       Problems Addressed this Visit        Cardiovascular and Mediastinum    Coronary artery disease involving native coronary artery of native heart without angina pectoris - Primary    Relevant Medications    amLODIPine (NORVASC) 2.5 MG tablet    HTN (hypertension)    Relevant Medications    amLODIPine (NORVASC) 2.5 MG tablet    Hyperlipemia       Endocrine    Hypothyroidism    Type 2 diabetes mellitus without complication, without long-term current use of insulin      Other Visit Diagnoses     Shortness of breath        Class 1 obesity due to excess calories with serious comorbidity and body mass index (BMI) of 31.0 to 31.9 in adult              Patient's Body mass index is 31.67 kg/m². BMI is above normal parameters. Recommendations include: nutrition counseling.          Electronically signed by LUISA Marion May 15, 2018 12:46 PM         Simple: Patient demonstrates quick and easy understanding/Returned Demonstration/Verbalized Understanding
